# Patient Record
Sex: MALE | Race: BLACK OR AFRICAN AMERICAN | Employment: UNEMPLOYED | URBAN - METROPOLITAN AREA
[De-identification: names, ages, dates, MRNs, and addresses within clinical notes are randomized per-mention and may not be internally consistent; named-entity substitution may affect disease eponyms.]

---

## 2022-05-05 ENCOUNTER — APPOINTMENT (OUTPATIENT)
Dept: GENERAL RADIOLOGY | Age: 22
End: 2022-05-05
Attending: STUDENT IN AN ORGANIZED HEALTH CARE EDUCATION/TRAINING PROGRAM
Payer: MEDICAID

## 2022-05-05 ENCOUNTER — HOSPITAL ENCOUNTER (EMERGENCY)
Age: 22
Discharge: HOME | End: 2022-05-05
Attending: STUDENT IN AN ORGANIZED HEALTH CARE EDUCATION/TRAINING PROGRAM
Payer: MEDICAID

## 2022-05-05 ENCOUNTER — APPOINTMENT (OUTPATIENT)
Dept: CT IMAGING | Age: 22
End: 2022-05-05
Attending: NURSE PRACTITIONER
Payer: MEDICAID

## 2022-05-05 VITALS
RESPIRATION RATE: 16 BRPM | DIASTOLIC BLOOD PRESSURE: 69 MMHG | WEIGHT: 240 LBS | HEIGHT: 74 IN | SYSTOLIC BLOOD PRESSURE: 126 MMHG | BODY MASS INDEX: 30.8 KG/M2 | TEMPERATURE: 99.8 F | HEART RATE: 100 BPM | OXYGEN SATURATION: 99 %

## 2022-05-05 DIAGNOSIS — R10.9 ABDOMINAL PAIN, UNSPECIFIED ABDOMINAL LOCATION: ICD-10-CM

## 2022-05-05 DIAGNOSIS — U07.1 COVID-19 VIRUS INFECTION: Primary | ICD-10-CM

## 2022-05-05 LAB
ALBUMIN SERPL-MCNC: 4.2 G/DL (ref 3.5–5)
ALBUMIN/GLOB SERPL: 1.2 {RATIO} (ref 1.1–2.2)
ALP SERPL-CCNC: 41 U/L (ref 45–117)
ALT SERPL-CCNC: 74 U/L (ref 12–78)
ANION GAP SERPL CALC-SCNC: 7 MMOL/L (ref 5–15)
APPEARANCE UR: CLEAR
AST SERPL W P-5'-P-CCNC: 41 U/L (ref 15–37)
BACTERIA URNS QL MICRO: NEGATIVE /HPF
BASOPHILS # BLD: 0 K/UL (ref 0–0.1)
BASOPHILS NFR BLD: 1 % (ref 0–1)
BILIRUB SERPL-MCNC: 0.8 MG/DL (ref 0.2–1)
BILIRUB UR QL: NEGATIVE
BUN SERPL-MCNC: 8 MG/DL (ref 6–20)
BUN/CREAT SERPL: 7 (ref 12–20)
CA-I BLD-MCNC: 10.1 MG/DL (ref 8.5–10.1)
CHLORIDE SERPL-SCNC: 105 MMOL/L (ref 97–108)
CO2 SERPL-SCNC: 23 MMOL/L (ref 21–32)
COLOR UR: ABNORMAL
COVID-19 RAPID TEST, COVR: DETECTED
CREAT SERPL-MCNC: 1.18 MG/DL (ref 0.7–1.3)
DIFFERENTIAL METHOD BLD: ABNORMAL
EOSINOPHIL # BLD: 0.1 K/UL (ref 0–0.4)
EOSINOPHIL NFR BLD: 1 % (ref 0–7)
ERYTHROCYTE [DISTWIDTH] IN BLOOD BY AUTOMATED COUNT: 14.7 % (ref 11.5–14.5)
FLUAV AG NPH QL IA: NEGATIVE
FLUBV AG NOSE QL IA: NEGATIVE
GLOBULIN SER CALC-MCNC: 3.5 G/DL (ref 2–4)
GLUCOSE SERPL-MCNC: 112 MG/DL (ref 65–100)
GLUCOSE UR STRIP.AUTO-MCNC: NEGATIVE MG/DL
HCT VFR BLD AUTO: 42.3 % (ref 36.6–50.3)
HGB BLD-MCNC: 14 G/DL (ref 12.1–17)
HGB UR QL STRIP: NEGATIVE
IMM GRANULOCYTES # BLD AUTO: 0 K/UL (ref 0–0.04)
IMM GRANULOCYTES NFR BLD AUTO: 0 % (ref 0–0.5)
KETONES UR QL STRIP.AUTO: NEGATIVE MG/DL
LEUKOCYTE ESTERASE UR QL STRIP.AUTO: NEGATIVE
LYMPHOCYTES # BLD: 0.8 K/UL (ref 0.8–3.5)
LYMPHOCYTES NFR BLD: 12 % (ref 12–49)
MCH RBC QN AUTO: 25 PG (ref 26–34)
MCHC RBC AUTO-ENTMCNC: 33.1 G/DL (ref 30–36.5)
MCV RBC AUTO: 75.7 FL (ref 80–99)
MONOCYTES # BLD: 0.7 K/UL (ref 0–1)
MONOCYTES NFR BLD: 10 % (ref 5–13)
MUCOUS THREADS URNS QL MICRO: ABNORMAL /LPF
NEUTS SEG # BLD: 5 K/UL (ref 1.8–8)
NEUTS SEG NFR BLD: 76 % (ref 32–75)
NITRITE UR QL STRIP.AUTO: NEGATIVE
NRBC # BLD: 0 K/UL (ref 0–0.01)
NRBC BLD-RTO: 0 PER 100 WBC
PH UR STRIP: 7 [PH] (ref 5–8)
PLATELET # BLD AUTO: 306 K/UL (ref 150–400)
PMV BLD AUTO: 10.4 FL (ref 8.9–12.9)
POTASSIUM SERPL-SCNC: 4.3 MMOL/L (ref 3.5–5.1)
PROT SERPL-MCNC: 7.7 G/DL (ref 6.4–8.2)
PROT UR STRIP-MCNC: 30 MG/DL
RBC # BLD AUTO: 5.59 M/UL (ref 4.1–5.7)
RBC #/AREA URNS HPF: ABNORMAL /HPF (ref 0–5)
SODIUM SERPL-SCNC: 135 MMOL/L (ref 136–145)
SP GR UR REFRACTOMETRY: 1.03 (ref 1–1.03)
UA: UC IF INDICATED,UAUC: ABNORMAL
UROBILINOGEN UR QL STRIP.AUTO: 2 EU/DL (ref 0.1–1)
WBC # BLD AUTO: 6.6 K/UL (ref 4.1–11.1)
WBC URNS QL MICRO: ABNORMAL /HPF (ref 0–4)

## 2022-05-05 PROCEDURE — 99285 EMERGENCY DEPT VISIT HI MDM: CPT

## 2022-05-05 PROCEDURE — 74011250636 HC RX REV CODE- 250/636: Performed by: NURSE PRACTITIONER

## 2022-05-05 PROCEDURE — 80053 COMPREHEN METABOLIC PANEL: CPT

## 2022-05-05 PROCEDURE — 96375 TX/PRO/DX INJ NEW DRUG ADDON: CPT

## 2022-05-05 PROCEDURE — 96374 THER/PROPH/DIAG INJ IV PUSH: CPT

## 2022-05-05 PROCEDURE — 96361 HYDRATE IV INFUSION ADD-ON: CPT

## 2022-05-05 PROCEDURE — 87804 INFLUENZA ASSAY W/OPTIC: CPT

## 2022-05-05 PROCEDURE — 74177 CT ABD & PELVIS W/CONTRAST: CPT

## 2022-05-05 PROCEDURE — 71045 X-RAY EXAM CHEST 1 VIEW: CPT

## 2022-05-05 PROCEDURE — 74011000636 HC RX REV CODE- 636: Performed by: NURSE PRACTITIONER

## 2022-05-05 PROCEDURE — 85025 COMPLETE CBC W/AUTO DIFF WBC: CPT

## 2022-05-05 PROCEDURE — 87635 SARS-COV-2 COVID-19 AMP PRB: CPT

## 2022-05-05 PROCEDURE — 36415 COLL VENOUS BLD VENIPUNCTURE: CPT

## 2022-05-05 PROCEDURE — 81001 URINALYSIS AUTO W/SCOPE: CPT

## 2022-05-05 RX ORDER — ONDANSETRON 2 MG/ML
4 INJECTION INTRAMUSCULAR; INTRAVENOUS
Status: COMPLETED | OUTPATIENT
Start: 2022-05-05 | End: 2022-05-05

## 2022-05-05 RX ORDER — FAMOTIDINE 20 MG/1
20 TABLET, FILM COATED ORAL 2 TIMES DAILY
Qty: 20 TABLET | Refills: 0 | Status: SHIPPED | OUTPATIENT
Start: 2022-05-05 | End: 2022-05-15

## 2022-05-05 RX ORDER — KETOROLAC TROMETHAMINE 30 MG/ML
30 INJECTION, SOLUTION INTRAMUSCULAR; INTRAVENOUS
Status: COMPLETED | OUTPATIENT
Start: 2022-05-05 | End: 2022-05-05

## 2022-05-05 RX ADMIN — ONDANSETRON 4 MG: 2 INJECTION INTRAMUSCULAR; INTRAVENOUS at 18:35

## 2022-05-05 RX ADMIN — IOPAMIDOL 100 ML: 755 INJECTION, SOLUTION INTRAVENOUS at 20:17

## 2022-05-05 RX ADMIN — KETOROLAC TROMETHAMINE 30 MG: 30 INJECTION, SOLUTION INTRAMUSCULAR; INTRAVENOUS at 18:30

## 2022-05-05 RX ADMIN — SODIUM CHLORIDE 1000 ML: 9 INJECTION, SOLUTION INTRAVENOUS at 18:35

## 2022-05-05 NOTE — Clinical Note
[address] [facility] EMERGENCY DEPT  [address] [facility] [id number] [phone]    Work/School Note    Date: 5/5/2022     To Whom It May concern:    Ayden COSMO Inman DAYLIN was evaluated by the following provider(s):  Nurse Practitioner: Portia Rose NP.   [facility] virus is suspected. Per the CDC guidelines we recommend home isolation until the following conditions are all met:    1. At least five days have passed since symptoms first appeared and/or had a close exposure,   2. After home isolation for five days, wearing a mask around others for the next five days,  3. At least 24 have passed since last fever without the use of fever-reducing medications and  4.  Symptoms (eg cough, shortness of breath) have improved      Sincerely,          Jose Raul Albarran NP

## 2022-05-05 NOTE — ED TRIAGE NOTES
Pt complains of abd pain, nausea, fever and headaches since this morning. States he has taken tylenol with no relief. States he thinks its his appendix.

## 2022-05-05 NOTE — ED PROVIDER NOTES
EMERGENCY DEPARTMENT HISTORY AND PHYSICAL EXAM      Date: 5/5/2022  Patient Name: Teresa Burch III    History of Presenting Illness     Chief Complaint   Patient presents with    Abdominal Pain    Nausea       History Provided By: Patient    HPI: Matthew Greco, 23 y.o. male with a past medical history significant No significant past medical history presents to the ED with cc of abdominal pain. Patient reports worsening right flank pain radiating around to his right lower abdomen since this AM.  He reports associated nausea and chills; febrile upon arrival, unaware. He denies any dysuria, hematuria, penile discharge, diarrhea/constipation or other recent illness. He describes his pain as moderate to severe, sharp in nature, 8/10 presently, no aggravating or alleviating factors. He has not tried taking anything for his symptoms. There are no other complaints, changes, or physical findings at this time. PCP: Shantel George MD    No current facility-administered medications on file prior to encounter. No current outpatient medications on file prior to encounter. Past History     Past Medical History:  No past medical history on file. Past Surgical History:  No past surgical history on file. Family History:  No family history on file. Social History:  Social History     Tobacco Use    Smoking status: Not on file    Smokeless tobacco: Not on file   Substance Use Topics    Alcohol use: Not on file    Drug use: Not on file       Allergies:  No Known Allergies      Review of Systems     Review of Systems   Constitutional: Positive for chills. Negative for fatigue and fever. Respiratory: Negative. Negative for shortness of breath. Cardiovascular: Negative. Negative for chest pain and palpitations. Gastrointestinal: Positive for abdominal pain and nausea. Negative for constipation, diarrhea and vomiting. Genitourinary: Positive for flank pain.  Negative for dysuria, hematuria and penile discharge. Neurological: Negative. All other systems reviewed and are negative. Physical Exam     Physical Exam  Vitals and nursing note reviewed. Constitutional:       General: He is in acute distress. Appearance: He is ill-appearing and diaphoretic. HENT:      Head: Normocephalic and atraumatic. Eyes:      Extraocular Movements: Extraocular movements intact. Conjunctiva/sclera: Conjunctivae normal.   Cardiovascular:      Rate and Rhythm: Regular rhythm. Tachycardia present. Heart sounds: Normal heart sounds. Pulmonary:      Effort: Pulmonary effort is normal.      Breath sounds: Normal breath sounds. No wheezing or rales. Abdominal:      General: Bowel sounds are normal.      Palpations: Abdomen is soft. Tenderness: There is no abdominal tenderness. There is right CVA tenderness. There is no guarding or rebound. Negative signs include McBurney's sign. Musculoskeletal:         General: Normal range of motion. Cervical back: Normal range of motion and neck supple. Skin:     General: Skin is warm. Coloration: Skin is pale. Comments: Diaphoretic   Neurological:      General: No focal deficit present. Mental Status: He is alert. Lab and Diagnostic Study Results     Labs -     Recent Results (from the past 12 hour(s))   METABOLIC PANEL, COMPREHENSIVE    Collection Time: 05/05/22  6:38 PM   Result Value Ref Range    Sodium 135 (L) 136 - 145 mmol/L    Potassium 4.3 3.5 - 5.1 mmol/L    Chloride 105 97 - 108 mmol/L    CO2 23 21 - 32 mmol/L    Anion gap 7 5 - 15 mmol/L    Glucose 112 (H) 65 - 100 mg/dL    BUN 8 6 - 20 mg/dL    Creatinine 1.18 0.70 - 1.30 mg/dL    BUN/Creatinine ratio 7 (L) 12 - 20      GFR est AA >60 >60 ml/min/1.73m2    GFR est non-AA >60 >60 ml/min/1.73m2    Calcium 10.1 8.5 - 10.1 mg/dL    Bilirubin, total 0.8 0.2 - 1.0 mg/dL    AST (SGOT) 41 (H) 15 - 37 U/L    ALT (SGPT) 74 12 - 78 U/L    Alk.  phosphatase 41 (L) 45 - 117 U/L    Protein, total 7.7 6.4 - 8.2 g/dL    Albumin 4.2 3.5 - 5.0 g/dL    Globulin 3.5 2.0 - 4.0 g/dL    A-G Ratio 1.2 1.1 - 2.2     CBC WITH AUTOMATED DIFF    Collection Time: 05/05/22  6:38 PM   Result Value Ref Range    WBC 6.6 4.1 - 11.1 K/uL    RBC 5.59 4. 10 - 5.70 M/uL    HGB 14.0 12.1 - 17.0 g/dL    HCT 42.3 36.6 - 50.3 %    MCV 75.7 (L) 80.0 - 99.0 FL    MCH 25.0 (L) 26.0 - 34.0 PG    MCHC 33.1 30.0 - 36.5 g/dL    RDW 14.7 (H) 11.5 - 14.5 %    PLATELET 575 223 - 003 K/uL    MPV 10.4 8.9 - 12.9 FL    NRBC 0.0 0.0  WBC    ABSOLUTE NRBC 0.00 0.00 - 0.01 K/uL    NEUTROPHILS 76 (H) 32 - 75 %    LYMPHOCYTES 12 12 - 49 %    MONOCYTES 10 5 - 13 %    EOSINOPHILS 1 0 - 7 %    BASOPHILS 1 0 - 1 %    IMMATURE GRANULOCYTES 0 0 - 0.5 %    ABS. NEUTROPHILS 5.0 1.8 - 8.0 K/UL    ABS. LYMPHOCYTES 0.8 0.8 - 3.5 K/UL    ABS. MONOCYTES 0.7 0.0 - 1.0 K/UL    ABS. EOSINOPHILS 0.1 0.0 - 0.4 K/UL    ABS. BASOPHILS 0.0 0.0 - 0.1 K/UL    ABS. IMM.  GRANS. 0.0 0.00 - 0.04 K/UL    DF AUTOMATED     COVID-19 RAPID TEST    Collection Time: 05/05/22  6:38 PM   Result Value Ref Range    COVID-19 rapid test DETECTED (A) Not Detected     INFLUENZA A & B AG (RAPID TEST)    Collection Time: 05/05/22  6:38 PM   Result Value Ref Range    Influenza A Antigen Negative Negative      Influenza B Antigen Negative Negative     URINALYSIS W/ REFLEX CULTURE    Collection Time: 05/05/22  7:38 PM    Specimen: Urine   Result Value Ref Range    Color Yellow/Straw      Appearance Clear Clear      Specific gravity 1.027 1.003 - 1.030      pH (UA) 7.0 5.0 - 8.0      Protein 30 (A) Negative mg/dL    Glucose Negative Negative mg/dL    Ketone Negative Negative mg/dL    Bilirubin Negative Negative      Blood Negative Negative      Urobilinogen 2.0 (H) 0.1 - 1.0 EU/dL    Nitrites Negative Negative      Leukocyte Esterase Negative Negative      UA:UC IF INDICATED Culture not indicated by UA result Culture not indicated by UA result      WBC 0-4 0 - 4 /hpf    RBC 0-5 0 - 5 /hpf    Bacteria Negative Negative /hpf    Mucus 4+ /lpf       Radiologic Studies -   @[email or web address]@  CT Results  (Last 48 hours)               05/05/22 2017  CT ABD PELV W CONT Final result    Impression:  Normal exam. No evidence to explain flank pain or fever       Narrative:  CT dose reduction was achieved through use of a standardized protocol tailored   for this examination and automatic exposure control for dose modulation. Contrast study shows clear lung bases       Liver, spleen, pancreas, gallbladder, adrenals and kidneys are normal.   Specifically, no striated nephrogram       No small bowel vascular abnormality, lymphadenopathy or free fluid       Normal prostate. Bladder is empty. Normal colon and appendix. No mesenteric fat   edema or abdominal hernia       No significant bone finding               CXR Results  (Last 48 hours)               05/05/22 1855  XR CHEST PORT Final result    Narrative:  Chest single view. Lungs clear; no interstitial or alveolar pulmonary edema. Cardiac and   mediastinal structures within normal limits. No pneumothorax or sizable pleural   effusion. Medical Decision Making   - I am the first provider for this patient. - I reviewed the vital signs, available nursing notes, past medical history, past surgical history, family history and social history. - Initial assessment performed. The patients presenting problems have been discussed, and they are in agreement with the care plan formulated and outlined with them. I have encouraged them to ask questions as they arise throughout their visit. Vital Signs-Reviewed the patient's vital signs.   Patient Vitals for the past 12 hrs:   Temp Pulse Resp BP SpO2   05/05/22 2207 99.8 °F (37.7 °C) 100 16 126/69 99 %   05/05/22 2119 99.9 °F (37.7 °C)       05/05/22 1813 (!) 101.9 °F (38.8 °C) (!) 115 18 117/73 98 %       Records Reviewed: Nursing Notes    The patient presents with abdominal pain with a differential diagnosis of abdominal pain, appendicitis, cholecystitis, gastritis, pancreatitis, PUD and renal Colic      ED Course:   Patient presents with 1 day history of right abdominal/flank pain; denies any other symptoms. He is in moderate distress upon arrival, diaphoretic and uncomfortable appearing. Patient is febrile at 101.9, unaware, no tachycardia or hypoxia. Labs: no leukocytosis, influenza negative, UA normal, COVID positive. CT abdomen without acute findings. Patient was treated with IVF and antipyretics with improvement of symptoms. Tolerating p.o. intake. Reinforced importance of self-isolation. Discussed symptomatic treatment along with red flags and return precautions. pcp follow up  ED Course as of 05/06/22 0449   Thu May 05, 2022   1930 Patient states pain has improved some, now \"tolerable\" [LP]   2040 Updated on results, awaiting CT results still [LP]      ED Course User Index  [LP] Harriet Sprague NP       Provider Notes (Medical Decision Making):     MDM  Number of Diagnoses or Management Options  Abdominal pain, unspecified abdominal location: new, needed workup  COVID-19 virus infection: new, no workup     Amount and/or Complexity of Data Reviewed  Clinical lab tests: ordered and reviewed  Tests in the radiology section of CPT®: ordered and reviewed  Review and summarize past medical records: yes    Risk of Complications, Morbidity, and/or Mortality  Presenting problems: moderate  Diagnostic procedures: moderate  Management options: moderate    Patient Progress  Patient progress: stable         Disposition   Disposition: Condition stable  DC- Adult Discharges: All of the diagnostic tests were reviewed and questions answered. Diagnosis, care plan and treatment options were discussed. The patient understands the instructions and will follow up as directed. The patients results have been reviewed with them. They have been counseled regarding their diagnosis.   The patient verbally convey understanding and agreement of the signs, symptoms, diagnosis, treatment and prognosis and additionally agrees to follow up as recommended with their PCP in 24 - 48 hours. They also agree with the care-plan and convey that all of their questions have been answered. I have also put together some discharge instructions for them that include: 1) educational information regarding their diagnosis, 2) how to care for their diagnosis at home, as well a 3) list of reasons why they would want to return to the ED prior to their follow-up appointment, should their condition change. DC-The patient was given verbal abdominal pain warning signs and, fever treatment  and follow-up instructions  DC- Pain Control DC Home plan: Nonsteroidals, Tylenol, Referral Family Medicine/PCP and Advised to return for worsening or additional problems such as abdominal or chest pain        DISCHARGE PLAN:  1. There are no discharge medications for this patient. 2.   Follow-up Information     Follow up With Specialties Details Why Contact Info    Dean Dixon MD Internal Medicine Physician Schedule an appointment as soon as possible for a visit in 2 days for ER follow up [place] [id number]  [phone]          3. Return to ED if worse   4. Discharge Medication List as of 5/5/2022 10:02 PM            Diagnosis     Clinical Impression:   1. COVID-19 virus infection    2. Abdominal pain, unspecified abdominal location        Attestations:    Ashli Kinney NP    Please note that this dictation was completed with Kickboard, the computer voice recognition software. Quite often unanticipated grammatical, syntax, homophones, and other interpretive errors are inadvertently transcribed by the computer software. Please disregard these errors. Please excuse any errors that have escaped final proofreading. Thank you.

## 2022-05-06 ENCOUNTER — PATIENT OUTREACH (OUTPATIENT)
Dept: CASE MANAGEMENT | Age: 22
End: 2022-05-06

## 2022-05-06 NOTE — DISCHARGE INSTRUCTIONS
Thank you! Thank you for allowing me to care for you in the emergency department. I sincerely hope that you are satisfied with your visit today. It is my goal to provide you with excellent care. Below you will find a list of your labs and imaging from your visit today. Should you have any questions regarding these results please do not hesitate to call the emergency department. Labs -     Recent Results (from the past 12 hour(s))   METABOLIC PANEL, COMPREHENSIVE    Collection Time: 05/05/22  6:38 PM   Result Value Ref Range    Sodium 135 (L) 136 - 145 mmol/L    Potassium 4.3 3.5 - 5.1 mmol/L    Chloride 105 97 - 108 mmol/L    CO2 23 21 - 32 mmol/L    Anion gap 7 5 - 15 mmol/L    Glucose 112 (H) 65 - 100 mg/dL    BUN 8 6 - 20 mg/dL    Creatinine 1.18 0.70 - 1.30 mg/dL    BUN/Creatinine ratio 7 (L) 12 - 20      GFR est AA >60 >60 ml/min/1.73m2    GFR est non-AA >60 >60 ml/min/1.73m2    Calcium 10.1 8.5 - 10.1 mg/dL    Bilirubin, total 0.8 0.2 - 1.0 mg/dL    AST (SGOT) 41 (H) 15 - 37 U/L    ALT (SGPT) 74 12 - 78 U/L    Alk. phosphatase 41 (L) 45 - 117 U/L    Protein, total 7.7 6.4 - 8.2 g/dL    Albumin 4.2 3.5 - 5.0 g/dL    Globulin 3.5 2.0 - 4.0 g/dL    A-G Ratio 1.2 1.1 - 2.2     CBC WITH AUTOMATED DIFF    Collection Time: 05/05/22  6:38 PM   Result Value Ref Range    WBC 6.6 4.1 - 11.1 K/uL    RBC 5.59 4. 10 - 5.70 M/uL    HGB 14.0 12.1 - 17.0 g/dL    HCT 42.3 36.6 - 50.3 %    MCV 75.7 (L) 80.0 - 99.0 FL    MCH 25.0 (L) 26.0 - 34.0 PG    MCHC 33.1 30.0 - 36.5 g/dL    RDW 14.7 (H) 11.5 - 14.5 %    PLATELET 318 592 - 902 K/uL    MPV 10.4 8.9 - 12.9 FL    NRBC 0.0 0.0  WBC    ABSOLUTE NRBC 0.00 0.00 - 0.01 K/uL    NEUTROPHILS 76 (H) 32 - 75 %    LYMPHOCYTES 12 12 - 49 %    MONOCYTES 10 5 - 13 %    EOSINOPHILS 1 0 - 7 %    BASOPHILS 1 0 - 1 %    IMMATURE GRANULOCYTES 0 0 - 0.5 %    ABS. NEUTROPHILS 5.0 1.8 - 8.0 K/UL    ABS. LYMPHOCYTES 0.8 0.8 - 3.5 K/UL    ABS. MONOCYTES 0.7 0.0 - 1.0 K/UL    ABS. EOSINOPHILS 0.1 0.0 - 0.4 K/UL    ABS. BASOPHILS 0.0 0.0 - 0.1 K/UL    ABS. IMM. GRANS. 0.0 0.00 - 0.04 K/UL    DF AUTOMATED     COVID-19 RAPID TEST    Collection Time: 05/05/22  6:38 PM   Result Value Ref Range    COVID-19 rapid test DETECTED (A) Not Detected     INFLUENZA A & B AG (RAPID TEST)    Collection Time: 05/05/22  6:38 PM   Result Value Ref Range    Influenza A Antigen Negative Negative      Influenza B Antigen Negative Negative     URINALYSIS W/ REFLEX CULTURE    Collection Time: 05/05/22  7:38 PM    Specimen: Urine   Result Value Ref Range    Color Yellow/Straw      Appearance Clear Clear      Specific gravity 1.027 1.003 - 1.030      pH (UA) 7.0 5.0 - 8.0      Protein 30 (A) Negative mg/dL    Glucose Negative Negative mg/dL    Ketone Negative Negative mg/dL    Bilirubin Negative Negative      Blood Negative Negative      Urobilinogen 2.0 (H) 0.1 - 1.0 EU/dL    Nitrites Negative Negative      Leukocyte Esterase Negative Negative      UA:UC IF INDICATED Culture not indicated by UA result Culture not indicated by UA result      WBC 0-4 0 - 4 /hpf    RBC 0-5 0 - 5 /hpf    Bacteria Negative Negative /hpf    Mucus 4+ /lpf       Radiologic Studies -   CT ABD PELV W CONT   Final Result   Normal exam. No evidence to explain flank pain or fever      XR CHEST PORT   Final Result        CT Results  (Last 48 hours)                 05/05/22 2017  CT ABD PELV W CONT Final result    Impression:  Normal exam. No evidence to explain flank pain or fever       Narrative:  CT dose reduction was achieved through use of a standardized protocol tailored   for this examination and automatic exposure control for dose modulation. Contrast study shows clear lung bases       Liver, spleen, pancreas, gallbladder, adrenals and kidneys are normal.   Specifically, no striated nephrogram       No small bowel vascular abnormality, lymphadenopathy or free fluid       Normal prostate. Bladder is empty. Normal colon and appendix.  No mesenteric fat   edema or abdominal hernia       No significant bone finding                 CXR Results  (Last 48 hours)                 05/05/22 9658  XR CHEST PORT Final result    Narrative:  Chest single view. Lungs clear; no interstitial or alveolar pulmonary edema. Cardiac and   mediastinal structures within normal limits. No pneumothorax or sizable pleural   effusion. If you feel that you have not received excellent quality care or timely care, please ask to speak to the nurse manager. Please choose us in the future for your continued health care needs. ------------------------------------------------------------------------------------------------------------  The exam and treatment you received in the Emergency Department were for an urgent problem and are not intended as complete care. It is important that you follow-up with a doctor, nurse practitioner, or physician assistant to:  (1) confirm your diagnosis,  (2) re-evaluation of changes in your illness and treatment, and  (3) for ongoing care. If your symptoms become worse or you do not improve as expected and you are unable to reach your usual health care provider, you should return to the Emergency Department. We are available 24 hours a day. Please take your discharge instructions with you when you go to your follow-up appointment. If you have any problem arranging a follow-up appointment, contact the Emergency Department immediately. If a prescription has been provided, please have it filled as soon as possible to prevent a delay in treatment. Read the entire medication instruction sheet provided to you by the pharmacy. If you have any questions or reservations about taking the medication due to side effects or interactions with other medications, please call your primary care physician or contact the ER to speak with the charge nurse.      Make an appointment with your family doctor or the physician you were referred to for follow-up of this visit as instructed on your discharge paperwork, as this is a mandatory follow-up. Return to the ER if you are unable to be seen or if you are unable to be seen in a timely manner. If you have any problem arranging the follow-up visit, contact the Emergency Department immediately.

## 2024-07-18 ENCOUNTER — HOSPITAL ENCOUNTER (EMERGENCY)
Facility: HOSPITAL | Age: 24
Discharge: HOME | End: 2024-07-18
Attending: EMERGENCY MEDICINE
Payer: MEDICAID

## 2024-07-18 VITALS
HEIGHT: 74 IN | TEMPERATURE: 97.8 F | HEART RATE: 59 BPM | DIASTOLIC BLOOD PRESSURE: 91 MMHG | RESPIRATION RATE: 16 BRPM | OXYGEN SATURATION: 99 % | SYSTOLIC BLOOD PRESSURE: 136 MMHG | BODY MASS INDEX: 30.16 KG/M2 | WEIGHT: 235 LBS

## 2024-07-18 DIAGNOSIS — R10.13 EPIGASTRIC PAIN: ICD-10-CM

## 2024-07-18 DIAGNOSIS — R11.2 NAUSEA AND VOMITING, UNSPECIFIED VOMITING TYPE: Primary | ICD-10-CM

## 2024-07-18 LAB
ALBUMIN SERPL-MCNC: 4.1 G/DL (ref 3.5–5)
ALBUMIN/GLOB SERPL: 1.1 (ref 1.1–2.2)
ALP SERPL-CCNC: 42 U/L (ref 45–117)
ALT SERPL-CCNC: 20 U/L (ref 12–78)
AMPHET UR QL SCN: NEGATIVE
ANION GAP SERPL CALC-SCNC: 6 MMOL/L (ref 5–15)
APPEARANCE UR: CLEAR
AST SERPL W P-5'-P-CCNC: ABNORMAL U/L (ref 15–37)
BACTERIA URNS QL MICRO: NEGATIVE /HPF
BARBITURATES UR QL SCN: NEGATIVE
BASOPHILS # BLD: 0.1 K/UL (ref 0–0.1)
BASOPHILS NFR BLD: 0 % (ref 0–1)
BENZODIAZ UR QL: NEGATIVE
BILIRUB SERPL-MCNC: 0.9 MG/DL (ref 0.2–1)
BILIRUB UR QL: NEGATIVE
BUN SERPL-MCNC: 10 MG/DL (ref 6–20)
BUN/CREAT SERPL: 9 (ref 12–20)
CA-I BLD-MCNC: 9.4 MG/DL (ref 8.5–10.1)
CANNABINOIDS UR QL SCN: NEGATIVE
CHLORIDE SERPL-SCNC: 105 MMOL/L (ref 97–108)
CO2 SERPL-SCNC: 28 MMOL/L (ref 21–32)
COCAINE UR QL SCN: NEGATIVE
COLOR UR: ABNORMAL
CREAT SERPL-MCNC: 1.1 MG/DL (ref 0.7–1.3)
DIFFERENTIAL METHOD BLD: ABNORMAL
EOSINOPHIL # BLD: 0.1 K/UL (ref 0–0.4)
EOSINOPHIL NFR BLD: 1 % (ref 0–7)
ERYTHROCYTE [DISTWIDTH] IN BLOOD BY AUTOMATED COUNT: 14.2 % (ref 11.5–14.5)
GLOBULIN SER CALC-MCNC: 3.9 G/DL (ref 2–4)
GLUCOSE SERPL-MCNC: 102 MG/DL (ref 65–100)
GLUCOSE UR STRIP.AUTO-MCNC: NEGATIVE MG/DL
HCT VFR BLD AUTO: 43.6 % (ref 36.6–50.3)
HGB BLD-MCNC: 14 G/DL (ref 12.1–17)
HGB UR QL STRIP: NEGATIVE
IMM GRANULOCYTES # BLD AUTO: 0.1 K/UL (ref 0–0.04)
IMM GRANULOCYTES NFR BLD AUTO: 0 % (ref 0–0.5)
KETONES UR QL STRIP.AUTO: 5 MG/DL
LEUKOCYTE ESTERASE UR QL STRIP.AUTO: NEGATIVE
LIPASE SERPL-CCNC: 17 U/L (ref 13–75)
LYMPHOCYTES # BLD: 1.8 K/UL (ref 0.8–3.5)
LYMPHOCYTES NFR BLD: 14 % (ref 12–49)
Lab: NORMAL
MCH RBC QN AUTO: 25.5 PG (ref 26–34)
MCHC RBC AUTO-ENTMCNC: 32.1 G/DL (ref 30–36.5)
MCV RBC AUTO: 79.6 FL (ref 80–99)
METHADONE UR QL: NEGATIVE
MONOCYTES # BLD: 0.6 K/UL (ref 0–1)
MONOCYTES NFR BLD: 4 % (ref 5–13)
MUCOUS THREADS URNS QL MICRO: ABNORMAL /LPF
NEUTS SEG # BLD: 10.1 K/UL (ref 1.8–8)
NEUTS SEG NFR BLD: 81 % (ref 32–75)
NITRITE UR QL STRIP.AUTO: NEGATIVE
NRBC # BLD: 0 K/UL (ref 0–0.01)
NRBC BLD-RTO: 0 PER 100 WBC
OPIATES UR QL: NEGATIVE
PCP UR QL: NEGATIVE
PH UR STRIP: 5 (ref 5–8)
PLATELET # BLD AUTO: 368 K/UL (ref 150–400)
PMV BLD AUTO: 10.6 FL (ref 8.9–12.9)
POTASSIUM SERPL-SCNC: ABNORMAL MMOL/L (ref 3.5–5.1)
PROT SERPL-MCNC: 8 G/DL (ref 6.4–8.2)
PROT UR STRIP-MCNC: NEGATIVE MG/DL
RBC # BLD AUTO: 5.48 M/UL (ref 4.1–5.7)
RBC #/AREA URNS HPF: ABNORMAL /HPF (ref 0–5)
SODIUM SERPL-SCNC: 139 MMOL/L (ref 136–145)
SP GR UR REFRACTOMETRY: 1.02 (ref 1–1.03)
UROBILINOGEN UR QL STRIP.AUTO: 0.1 EU/DL (ref 0.1–1)
WBC # BLD AUTO: 12.6 K/UL (ref 4.1–11.1)
WBC URNS QL MICRO: ABNORMAL /HPF (ref 0–4)

## 2024-07-18 PROCEDURE — 6360000002 HC RX W HCPCS: Performed by: EMERGENCY MEDICINE

## 2024-07-18 PROCEDURE — 96374 THER/PROPH/DIAG INJ IV PUSH: CPT

## 2024-07-18 PROCEDURE — 83690 ASSAY OF LIPASE: CPT

## 2024-07-18 PROCEDURE — 36415 COLL VENOUS BLD VENIPUNCTURE: CPT

## 2024-07-18 PROCEDURE — 80053 COMPREHEN METABOLIC PANEL: CPT

## 2024-07-18 PROCEDURE — 99284 EMERGENCY DEPT VISIT MOD MDM: CPT

## 2024-07-18 PROCEDURE — 81001 URINALYSIS AUTO W/SCOPE: CPT

## 2024-07-18 PROCEDURE — 85025 COMPLETE CBC W/AUTO DIFF WBC: CPT

## 2024-07-18 PROCEDURE — 80307 DRUG TEST PRSMV CHEM ANLYZR: CPT

## 2024-07-18 PROCEDURE — 6370000000 HC RX 637 (ALT 250 FOR IP): Performed by: EMERGENCY MEDICINE

## 2024-07-18 PROCEDURE — 99285 EMERGENCY DEPT VISIT HI MDM: CPT

## 2024-07-18 RX ORDER — LIDOCAINE HYDROCHLORIDE 20 MG/ML
15 SOLUTION OROPHARYNGEAL EVERY 4 HOURS PRN
Qty: 250 ML | Refills: 0 | Status: SHIPPED | OUTPATIENT
Start: 2024-07-18

## 2024-07-18 RX ORDER — MAGNESIUM HYDROXIDE/ALUMINUM HYDROXICE/SIMETHICONE 120; 1200; 1200 MG/30ML; MG/30ML; MG/30ML
30 SUSPENSION ORAL
Status: COMPLETED | OUTPATIENT
Start: 2024-07-18 | End: 2024-07-18

## 2024-07-18 RX ORDER — ONDANSETRON 2 MG/ML
4 INJECTION INTRAMUSCULAR; INTRAVENOUS ONCE
Status: COMPLETED | OUTPATIENT
Start: 2024-07-18 | End: 2024-07-18

## 2024-07-18 RX ORDER — LIDOCAINE HYDROCHLORIDE 20 MG/ML
15 SOLUTION OROPHARYNGEAL
Status: COMPLETED | OUTPATIENT
Start: 2024-07-18 | End: 2024-07-18

## 2024-07-18 RX ORDER — ONDANSETRON 4 MG/1
4 TABLET, ORALLY DISINTEGRATING ORAL EVERY 8 HOURS PRN
Qty: 20 TABLET | Refills: 0 | Status: SHIPPED | OUTPATIENT
Start: 2024-07-18

## 2024-07-18 RX ADMIN — ONDANSETRON 4 MG: 2 INJECTION INTRAMUSCULAR; INTRAVENOUS at 05:00

## 2024-07-18 RX ADMIN — ALUMINUM HYDROXIDE, MAGNESIUM HYDROXIDE, AND SIMETHICONE 30 ML: 200; 200; 20 SUSPENSION ORAL at 06:27

## 2024-07-18 RX ADMIN — LIDOCAINE HYDROCHLORIDE 15 ML: 20 SOLUTION ORAL at 06:27

## 2024-07-18 ASSESSMENT — LIFESTYLE VARIABLES
HOW OFTEN DO YOU HAVE A DRINK CONTAINING ALCOHOL: NEVER
HOW MANY STANDARD DRINKS CONTAINING ALCOHOL DO YOU HAVE ON A TYPICAL DAY: PATIENT DOES NOT DRINK

## 2024-07-18 ASSESSMENT — PAIN SCALES - GENERAL
PAINLEVEL_OUTOF10: 4
PAINLEVEL_OUTOF10: 4

## 2024-07-18 ASSESSMENT — PAIN DESCRIPTION - DESCRIPTORS: DESCRIPTORS: SHARP

## 2024-07-18 ASSESSMENT — PAIN - FUNCTIONAL ASSESSMENT: PAIN_FUNCTIONAL_ASSESSMENT: 0-10

## 2024-07-18 ASSESSMENT — PAIN DESCRIPTION - LOCATION: LOCATION: ABDOMEN

## 2024-07-18 NOTE — ED PROVIDER NOTES
[facility] EMERGENCY DEPT  EMERGENCY DEPARTMENT HISTORY AND PHYSICAL EXAM      Date: 7/18/2024  Patient Name: Vinay Clay III  MRN: [id number]  Birthdate 2000  Date of evaluation: 7/18/2024  Provider: Dominguez Spencer MD   Note Started: 4:51 AM EDT 7/18/24    HISTORY OF PRESENT ILLNESS     Chief Complaint   Patient presents with    Abdominal Pain    Emesis       History Provided By: Patient and EMS    HPI: Vinay Clay III is a 24 y.o. male presents for evaluation of epigastric abdominal pain with nausea and vomiting.  Patient reports that he woke up around 2:00 this morning with the symptoms.  Denies diarrhea, denies fevers or chills.  EMS states the blood sugar was 141 on their evaluation, and they started a liter of fluids.    PAST MEDICAL HISTORY   Past Medical History:  History reviewed. No pertinent past medical history.    Past Surgical History:  History reviewed. No pertinent surgical history.    Family History:  History reviewed. No pertinent family history.    Social History:  Social History     Tobacco Use    Smoking status: Never    Smokeless tobacco: Never   Substance Use Topics    Alcohol use: Not Currently    Drug use: Never       Allergies:  No Known Allergies    PCP: Velia Pop MD    Current Meds:   No current facility-administered medications for this encounter.     Current Outpatient Medications   Medication Sig Dispense Refill    ondansetron (ZOFRAN-ODT) 4 MG disintegrating tablet Place 1 tablet under the tongue every 8 hours as needed for Nausea or Vomiting 20 tablet 0    lidocaine viscous hcl (XYLOCAINE) 2 % SOLN solution Take 15 mLs by mouth every 4 hours as needed (Reflux/abdominal pain) Mix each dose at home with 30 mL over-the-counter Maalox or Mylanta 250 mL 0       Social Determinants of Health:   Social Determinants of Health     Tobacco Use: Low Risk  (7/18/2024)    Patient History     Smoking Tobacco Use: Never     Smokeless Tobacco Use: Never     Passive Exposure:

## 2024-07-18 NOTE — DISCHARGE INSTRUCTIONS
Thank you for choosing our Emergency Department for your care.  It is our privilege to care for you in your time of need.  In the next several days, you may receive a survey via email or mailed to your home about your experience with our team.  We would greatly appreciate you taking a few minutes to complete the survey, as we use this information to learn what we have done well and what we could be doing better. Thank you for trusting us with your care!    Below you will find a list of your tests from today's visit.   Labs  Recent Results (from the past 12 hour(s))   CBC with Auto Differential    Collection Time: 07/18/24  4:57 AM   Result Value Ref Range    WBC 12.6 (H) 4.1 - 11.1 K/uL    RBC 5.48 4.10 - 5.70 M/uL    Hemoglobin 14.0 12.1 - 17.0 g/dL    Hematocrit 43.6 36.6 - 50.3 %    MCV 79.6 (L) 80.0 - 99.0 FL    MCH 25.5 (L) 26.0 - 34.0 PG    MCHC 32.1 30.0 - 36.5 g/dL    RDW 14.2 11.5 - 14.5 %    Platelets 368 150 - 400 K/uL    MPV 10.6 8.9 - 12.9 FL    Nucleated RBCs 0.0 0.0  WBC    nRBC 0.00 0.00 - 0.01 K/uL    Neutrophils % 81 (H) 32 - 75 %    Lymphocytes % 14 12 - 49 %    Monocytes % 4 (L) 5 - 13 %    Eosinophils % 1 0 - 7 %    Basophils % 0 0 - 1 %    Immature Granulocytes % 0 0 - 0.5 %    Neutrophils Absolute 10.1 (H) 1.8 - 8.0 K/UL    Lymphocytes Absolute 1.8 0.8 - 3.5 K/UL    Monocytes Absolute 0.6 0.0 - 1.0 K/UL    Eosinophils Absolute 0.1 0.0 - 0.4 K/UL    Basophils Absolute 0.1 0.0 - 0.1 K/UL    Immature Granulocytes Absolute 0.1 (H) 0.00 - 0.04 K/UL    Differential Type AUTOMATED     Comprehensive Metabolic Panel    Collection Time: 07/18/24  4:57 AM   Result Value Ref Range    Sodium 139 136 - 145 mmol/L    Potassium Hemolyzed, Recollection Recommended 3.5 - 5.1 mmol/L    Chloride 105 97 - 108 mmol/L    CO2 28 21 - 32 mmol/L    Anion Gap 6 5 - 15 mmol/L    Glucose 102 (H) 65 - 100 mg/dL    BUN 10 6 - 20 mg/dL    Creatinine 1.10 0.70 - 1.30 mg/dL    BUN/Creatinine Ratio 9 (L) 12 - 20       findings by GC/MS.         Radiologic Studies  No orders to display     ------------------------------------------------------------------------------------------------------------  The evaluation and treatment you received in the Emergency Department were for an urgent problem. It is important that you follow-up with a doctor, nurse practitioner, or physician assistant to:  (1) confirm your diagnosis,  (2) re-evaluation of changes in your illness and treatment, and (3) for ongoing care. Please take your discharge instructions with you when you go to your follow-up appointment.     If you have any problem arranging a follow-up appointment, contact us!  If your symptoms become worse or you do not improve as expected, please return to us. We are available 24 hours a day.     If a prescription has been provided, please fill it as soon as possible to prevent a delay in treatment. If you have any questions or reservations about taking the medication due to side effects or interactions with other medications, please call your primary care provider or contact us directly.  Again, THANK YOU for choosing us to care for YOU!

## 2024-07-18 NOTE — ED NOTES
Verbal report to Page RN. Patient connected to continuous cardiac, bp, sp02 monitoring. No signs of distress noted. Call bell within reach, bed locked and in lowest position. All questions answered accordingly.

## 2024-07-18 NOTE — ED TRIAGE NOTES
Per EMS, patient woke up around 0200 this AM with epigastric pain and vomiting.. Recent diagnosis of food poisoning per patient. Bradycardic in 50's for EMS. Alert and oriented on arrival, room air.      on EMS truck.

## 2024-07-19 ENCOUNTER — APPOINTMENT (OUTPATIENT)
Facility: HOSPITAL | Age: 24
End: 2024-07-19
Payer: MEDICAID

## 2024-07-19 ENCOUNTER — HOSPITAL ENCOUNTER (EMERGENCY)
Facility: HOSPITAL | Age: 24
Discharge: HOME | End: 2024-07-19
Attending: EMERGENCY MEDICINE
Payer: MEDICAID

## 2024-07-19 VITALS
RESPIRATION RATE: 16 BRPM | WEIGHT: 235 LBS | SYSTOLIC BLOOD PRESSURE: 128 MMHG | TEMPERATURE: 98.1 F | HEIGHT: 74 IN | DIASTOLIC BLOOD PRESSURE: 72 MMHG | HEART RATE: 68 BPM | OXYGEN SATURATION: 99 % | BODY MASS INDEX: 30.16 KG/M2

## 2024-07-19 DIAGNOSIS — R11.2 NAUSEA AND VOMITING, UNSPECIFIED VOMITING TYPE: ICD-10-CM

## 2024-07-19 DIAGNOSIS — R10.13 ABDOMINAL PAIN, EPIGASTRIC: Primary | ICD-10-CM

## 2024-07-19 LAB
ALBUMIN SERPL-MCNC: 4.3 G/DL (ref 3.5–5)
ALBUMIN/GLOB SERPL: 1.3 (ref 1.1–2.2)
ALP SERPL-CCNC: 60 U/L (ref 45–117)
ALT SERPL-CCNC: 109 U/L (ref 12–78)
ANION GAP BLD CALC-SCNC: 10
AST SERPL W P-5'-P-CCNC: 227 U/L (ref 15–37)
BASOPHILS # BLD: 0 K/UL (ref 0–0.1)
BASOPHILS NFR BLD: 0 % (ref 0–1)
BILIRUB DIRECT SERPL-MCNC: 0.7 MG/DL (ref 0–0.2)
BILIRUB SERPL-MCNC: 1.5 MG/DL (ref 0.2–1)
CA-I BLD-MCNC: 1.18 MMOL/L (ref 1.12–1.32)
CHLORIDE BLD-SCNC: 104 MMOL/L (ref 98–107)
CO2 BLD-SCNC: 32 MMOL/L
CREAT UR-MCNC: 0.96 MG/DL (ref 0.6–1.3)
DIFFERENTIAL METHOD BLD: ABNORMAL
EOSINOPHIL # BLD: 0.1 K/UL (ref 0–0.4)
EOSINOPHIL NFR BLD: 1 % (ref 0–7)
ERYTHROCYTE [DISTWIDTH] IN BLOOD BY AUTOMATED COUNT: 14.4 % (ref 11.5–14.5)
GLOBULIN SER CALC-MCNC: 3.4 G/DL (ref 2–4)
GLUCOSE BLD STRIP.AUTO-MCNC: 99 MG/DL (ref 65–100)
HCT VFR BLD AUTO: 45.3 % (ref 36.6–50.3)
HGB BLD-MCNC: 14.6 G/DL (ref 12.1–17)
IMM GRANULOCYTES # BLD AUTO: 0 K/UL (ref 0–0.04)
IMM GRANULOCYTES NFR BLD AUTO: 0 % (ref 0–0.5)
LACTATE BLD-SCNC: 1.3 MMOL/L (ref 0.4–2)
LIPASE SERPL-CCNC: 120 U/L (ref 13–75)
LYMPHOCYTES # BLD: 2.2 K/UL (ref 0.8–3.5)
LYMPHOCYTES NFR BLD: 25 % (ref 12–49)
MCH RBC QN AUTO: 25.4 PG (ref 26–34)
MCHC RBC AUTO-ENTMCNC: 32.2 G/DL (ref 30–36.5)
MCV RBC AUTO: 78.9 FL (ref 80–99)
MONOCYTES # BLD: 0.8 K/UL (ref 0–1)
MONOCYTES NFR BLD: 9 % (ref 5–13)
NEUTS SEG # BLD: 5.8 K/UL (ref 1.8–8)
NEUTS SEG NFR BLD: 65 % (ref 32–75)
NRBC # BLD: 0 K/UL (ref 0–0.01)
NRBC BLD-RTO: 0 PER 100 WBC
PERFORMED BY:: NORMAL
PLATELET # BLD AUTO: 365 K/UL (ref 150–400)
PMV BLD AUTO: 10.1 FL (ref 8.9–12.9)
POTASSIUM BLD-SCNC: 3.6 MMOL/L (ref 3.5–5.5)
PROT SERPL-MCNC: 7.7 G/DL (ref 6.4–8.2)
RBC # BLD AUTO: 5.74 M/UL (ref 4.1–5.7)
SODIUM BLD-SCNC: 145 MMOL/L (ref 136–145)
SPECIMEN SITE: NORMAL
WBC # BLD AUTO: 8.9 K/UL (ref 4.1–11.1)

## 2024-07-19 PROCEDURE — 94761 N-INVAS EAR/PLS OXIMETRY MLT: CPT

## 2024-07-19 PROCEDURE — 6370000000 HC RX 637 (ALT 250 FOR IP): Performed by: EMERGENCY MEDICINE

## 2024-07-19 PROCEDURE — 83690 ASSAY OF LIPASE: CPT

## 2024-07-19 PROCEDURE — 80076 HEPATIC FUNCTION PANEL: CPT

## 2024-07-19 PROCEDURE — 85025 COMPLETE CBC W/AUTO DIFF WBC: CPT

## 2024-07-19 PROCEDURE — 74177 CT ABD & PELVIS W/CONTRAST: CPT

## 2024-07-19 PROCEDURE — 36415 COLL VENOUS BLD VENIPUNCTURE: CPT

## 2024-07-19 PROCEDURE — 83605 ASSAY OF LACTIC ACID: CPT

## 2024-07-19 PROCEDURE — 6360000002 HC RX W HCPCS: Performed by: EMERGENCY MEDICINE

## 2024-07-19 PROCEDURE — 96374 THER/PROPH/DIAG INJ IV PUSH: CPT

## 2024-07-19 PROCEDURE — 80047 BASIC METABLC PNL IONIZED CA: CPT

## 2024-07-19 PROCEDURE — 6360000004 HC RX CONTRAST MEDICATION: Performed by: EMERGENCY MEDICINE

## 2024-07-19 RX ORDER — LIDOCAINE HYDROCHLORIDE 20 MG/ML
15 SOLUTION OROPHARYNGEAL
Status: COMPLETED | OUTPATIENT
Start: 2024-07-19 | End: 2024-07-19

## 2024-07-19 RX ORDER — MAGNESIUM HYDROXIDE/ALUMINUM HYDROXICE/SIMETHICONE 120; 1200; 1200 MG/30ML; MG/30ML; MG/30ML
30 SUSPENSION ORAL
Status: COMPLETED | OUTPATIENT
Start: 2024-07-19 | End: 2024-07-19

## 2024-07-19 RX ORDER — ONDANSETRON 2 MG/ML
4 INJECTION INTRAMUSCULAR; INTRAVENOUS ONCE
Status: COMPLETED | OUTPATIENT
Start: 2024-07-19 | End: 2024-07-19

## 2024-07-19 RX ADMIN — LIDOCAINE HYDROCHLORIDE 15 ML: 20 SOLUTION ORAL at 01:51

## 2024-07-19 RX ADMIN — ALUMINUM HYDROXIDE, MAGNESIUM HYDROXIDE, AND SIMETHICONE 30 ML: 1200; 120; 1200 SUSPENSION ORAL at 01:51

## 2024-07-19 RX ADMIN — ONDANSETRON 4 MG: 2 INJECTION INTRAMUSCULAR; INTRAVENOUS at 00:33

## 2024-07-19 RX ADMIN — IOPAMIDOL 100 ML: 755 INJECTION, SOLUTION INTRAVENOUS at 00:54

## 2024-07-19 ASSESSMENT — PAIN - FUNCTIONAL ASSESSMENT: PAIN_FUNCTIONAL_ASSESSMENT: 0-10

## 2024-07-19 ASSESSMENT — LIFESTYLE VARIABLES
HOW MANY STANDARD DRINKS CONTAINING ALCOHOL DO YOU HAVE ON A TYPICAL DAY: PATIENT DOES NOT DRINK
HOW OFTEN DO YOU HAVE A DRINK CONTAINING ALCOHOL: NEVER

## 2024-07-19 ASSESSMENT — PAIN SCALES - GENERAL
PAINLEVEL_OUTOF10: 8
PAINLEVEL_OUTOF10: 2

## 2024-07-19 ASSESSMENT — PAIN DESCRIPTION - LOCATION: LOCATION: ABDOMEN

## 2024-07-19 NOTE — ED PROVIDER NOTES
Not At Risk (7/19/2024)    AUDIT-C     Frequency of Alcohol Consumption: Never     Average Number of Drinks: Patient does not drink     Frequency of Binge Drinking: Never   Financial Resource Strain: Not on file   Food Insecurity: Not on file   Transportation Needs: Not on file   Physical Activity: Not on file   Stress: Not on file   Social Connections: Not on file   Intimate Partner Violence: Not on file   Depression: Not on file   Housing Stability: Not on file   Interpersonal Safety: Not At Risk (7/19/2024)    Interpersonal Safety Domain Source: IP Abuse Screening     Physical abuse: Denies     Verbal abuse: Denies     Emotional abuse: Denies     Financial abuse: Denies     Sexual abuse: Denies   Utilities: Not on file       PHYSICAL EXAM   Physical Exam  Physical Exam  Constitutional:       General: No acute distress.     Appearance: Normal appearance.  Not toxic-appearing.   HENT:      Head: Normocephalic and atraumatic.      Nose: Nose normal.      Mouth/Throat:      Mouth: Mucous membranes are moist.   Eyes:      Extraocular Movements: Extraocular movements intact.      Pupils: Pupils are equal, round, and reactive to light.   Cardiovascular:      Rate and Rhythm: Normal rate.      Pulses: Normal pulses.   Pulmonary:      Effort: Pulmonary effort is normal.      Breath sounds: No stridor.   Abdominal:      General: Abdomen is flat. There is no distension.  Epigastric tenderness to palpation with with voluntary guarding but without rebound  Musculoskeletal:         General: Normal range of motion.      Cervical back: Normal range of motion and neck supple.   Skin:     General: Skin is warm and dry.      Capillary Refill: Capillary refill takes less than 2 seconds.   Neurological:      General: No focal deficit present.      Mental Status: Aert and oriented to person, place, and time.         SCREENINGS                No data recorded    LAB, EKG AND DIAGNOSTIC RESULTS   Labs:  Recent Results (from the past 12     Performed by: Evie To        EKG:.Not Applicable    Radiologic Studies:  Non-plain film images such as CT, Ultrasound and MRI are read by the radiologist. Plain radiographic images are visualized and preliminarily interpreted by the ED Provider with the following findings: Not Applicable.    Interpretation per the Radiologist below, if available at the time of this note:  CT ABDOMEN PELVIS W IV CONTRAST Additional Contrast? None   Final Result   No acute abdominal or pelvic process.      Electronically signed by Nehemias Wright           ED COURSE and DIFFERENTIAL DIAGNOSIS/MDM   3:04 AM Differential and Considerations: Patient was seen by me yesterday, had resolved symptoms after GI cocktail and Zofran, we thought it was most likely something he ate versus possible viral infection.  He had a minimal elevation of his WBC yesterday, now that he is feeling worse again I will get a CT in addition to reevaluate his labs and treating his symptoms.    Records Reviewed (source and summary of external notes): Prior medical records and Nursing notes.    Vitals:    Vitals:    07/19/24 0015 07/19/24 0124   BP: (!) 137/90 122/60   Pulse: 81 66   Resp: 16 16   Temp: 98.2 °F (36.8 °C)    TempSrc: Oral    SpO2: 100% 98%   Weight: 106.6 kg (235 lb)    Height: 1.88 m (6' 2\")         ED COURSE   Patient with unremarkable workup, improvement again after Zofran and GI cocktail.  Patient states he did not feel his medications are prescribed to him yesterday's visit.  Discussed the need to do so, GI referral, return precautions.    SEPSIS Reassessment: Sepsis reassessment not applicable    Clinical Management Tools:  Not Applicable    Patient was given the following medications:  Medications   ondansetron (ZOFRAN) injection 4 mg (4 mg IntraVENous Given 7/19/24 0033)   iopamidol (ISOVUE-370) 76 % injection 100 mL (100 mLs IntraVENous Given 7/19/24 0054)   lidocaine viscous hcl (XYLOCAINE) 2 % solution 15 mL (15 mLs Mouth/Throat

## 2024-07-19 NOTE — ED TRIAGE NOTES
Abd pain with nausea and vomiting started yesterday. Seen in ed this am. Pt reports pain continues

## 2024-07-19 NOTE — DISCHARGE INSTRUCTIONS
Thank you for choosing our Emergency Department for your care.  It is our privilege to care for you in your time of need.  In the next several days, you may receive a survey via email or mailed to your home about your experience with our team.  We would greatly appreciate you taking a few minutes to complete the survey, as we use this information to learn what we have done well and what we could be doing better. Thank you for trusting us with your care!    Below you will find a list of your tests from today's visit.   Labs  Recent Results (from the past 12 hour(s))   CBC with Auto Differential    Collection Time: 07/19/24 12:35 AM   Result Value Ref Range    WBC 8.9 4.1 - 11.1 K/uL    RBC 5.74 (H) 4.10 - 5.70 M/uL    Hemoglobin 14.6 12.1 - 17.0 g/dL    Hematocrit 45.3 36.6 - 50.3 %    MCV 78.9 (L) 80.0 - 99.0 FL    MCH 25.4 (L) 26.0 - 34.0 PG    MCHC 32.2 30.0 - 36.5 g/dL    RDW 14.4 11.5 - 14.5 %    Platelets 365 150 - 400 K/uL    MPV 10.1 8.9 - 12.9 FL    Nucleated RBCs 0.0 0.0  WBC    nRBC 0.00 0.00 - 0.01 K/uL    Neutrophils % 65 32 - 75 %    Lymphocytes % 25 12 - 49 %    Monocytes % 9 5 - 13 %    Eosinophils % 1 0 - 7 %    Basophils % 0 0 - 1 %    Immature Granulocytes % 0 0 - 0.5 %    Neutrophils Absolute 5.8 1.8 - 8.0 K/UL    Lymphocytes Absolute 2.2 0.8 - 3.5 K/UL    Monocytes Absolute 0.8 0.0 - 1.0 K/UL    Eosinophils Absolute 0.1 0.0 - 0.4 K/UL    Basophils Absolute 0.0 0.0 - 0.1 K/UL    Immature Granulocytes Absolute 0.0 0.00 - 0.04 K/UL    Differential Type AUTOMATED     Hepatic Function Panel    Collection Time: 07/19/24 12:35 AM   Result Value Ref Range    Total Protein 7.7 6.4 - 8.2 g/dL    Albumin 4.3 3.5 - 5.0 g/dL    Globulin 3.4 2.0 - 4.0 g/dL    Albumin/Globulin Ratio 1.3 1.1 - 2.2      Total Bilirubin 1.5 (H) 0.2 - 1.0 mg/dL    Bilirubin, Direct 0.7 (H) 0.0 - 0.2 mg/dL    Alk Phosphatase 60 45 - 117 U/L     (H) 15 - 37 U/L     (H) 12 - 78 U/L   Lipase    Collection Time:

## 2024-11-26 ENCOUNTER — HOSPITAL ENCOUNTER (OUTPATIENT)
Facility: HOSPITAL | Age: 24
Discharge: HOME | End: 2024-11-29
Attending: INTERNAL MEDICINE
Payer: MEDICAID

## 2024-11-26 DIAGNOSIS — R10.9 STOMACH ACHE: ICD-10-CM

## 2024-11-26 PROCEDURE — 76705 ECHO EXAM OF ABDOMEN: CPT

## 2025-03-11 ENCOUNTER — HOSPITAL ENCOUNTER (EMERGENCY)
Facility: HOSPITAL | Age: 25
Discharge: HOME | End: 2025-03-11
Attending: EMERGENCY MEDICINE
Payer: MEDICAID

## 2025-03-11 VITALS
WEIGHT: 230 LBS | RESPIRATION RATE: 17 BRPM | HEIGHT: 74 IN | DIASTOLIC BLOOD PRESSURE: 91 MMHG | OXYGEN SATURATION: 99 % | SYSTOLIC BLOOD PRESSURE: 142 MMHG | HEART RATE: 82 BPM | TEMPERATURE: 97.6 F | BODY MASS INDEX: 29.52 KG/M2

## 2025-03-11 DIAGNOSIS — R11.0 NAUSEA: ICD-10-CM

## 2025-03-11 DIAGNOSIS — G47.00 INSOMNIA, UNSPECIFIED TYPE: Primary | ICD-10-CM

## 2025-03-11 LAB
EKG ATRIAL RATE: 64 BPM
EKG DIAGNOSIS: NORMAL
EKG P AXIS: 73 DEGREES
EKG P-R INTERVAL: 132 MS
EKG Q-T INTERVAL: 382 MS
EKG QRS DURATION: 92 MS
EKG QTC CALCULATION (BAZETT): 394 MS
EKG R AXIS: 70 DEGREES
EKG T AXIS: 66 DEGREES
EKG VENTRICULAR RATE: 64 BPM
FLUAV RNA SPEC QL NAA+PROBE: NOT DETECTED
FLUBV RNA SPEC QL NAA+PROBE: NOT DETECTED
SARS-COV-2 RNA RESP QL NAA+PROBE: NOT DETECTED

## 2025-03-11 PROCEDURE — 99284 EMERGENCY DEPT VISIT MOD MDM: CPT

## 2025-03-11 PROCEDURE — 93005 ELECTROCARDIOGRAM TRACING: CPT | Performed by: EMERGENCY MEDICINE

## 2025-03-11 PROCEDURE — 87636 SARSCOV2 & INF A&B AMP PRB: CPT

## 2025-03-11 PROCEDURE — 6370000000 HC RX 637 (ALT 250 FOR IP): Performed by: EMERGENCY MEDICINE

## 2025-03-11 RX ORDER — ONDANSETRON 4 MG/1
4 TABLET, ORALLY DISINTEGRATING ORAL ONCE
Status: COMPLETED | OUTPATIENT
Start: 2025-03-11 | End: 2025-03-11

## 2025-03-11 RX ORDER — ONDANSETRON 4 MG/1
4 TABLET, ORALLY DISINTEGRATING ORAL EVERY 8 HOURS PRN
Qty: 20 TABLET | Refills: 0 | Status: SHIPPED | OUTPATIENT
Start: 2025-03-11

## 2025-03-11 RX ADMIN — ONDANSETRON 4 MG: 4 TABLET, ORALLY DISINTEGRATING ORAL at 06:09

## 2025-03-11 ASSESSMENT — PAIN SCALES - GENERAL: PAINLEVEL_OUTOF10: 2

## 2025-03-11 NOTE — ED TRIAGE NOTES
Pt arrived w multiple complaints; insomnia, gas/bloating, nausea and hot flashes x 3-4 hours. Pt states he hasn't been able to sleep in a couple days. Denies any stress. When pt began to fall asleep felt his heart racing, tried to get up knees weak, and almost fell out.

## 2025-03-11 NOTE — ED PROVIDER NOTES
[facility] EMERGENCY DEPT  EMERGENCY DEPARTMENT HISTORY AND PHYSICAL EXAM      Date: 3/11/2025  Patient Name: Vinay Clay III  MRN: [id number]  Birthdate 2000  Date of evaluation: 3/11/2025  Provider: Dominguez Spencer MD   Note Started: 6:00 AM EDT 3/11/25    HISTORY OF PRESENT ILLNESS     Chief Complaint   Patient presents with    Insomnia    Nausea       History Provided By: Patient    HPI: Vinay Clay III is a 25 y.o. male presents for multiple complaints including nausea and diarrhea.  Patient states he felt like his heart was racing.  Patient states he had \"hot flashes\" over the last few hours.  Patient also reports insomnia.  Denies chest pain or shortness of breath.    PAST MEDICAL HISTORY   Past Medical History:  Past Medical History:   Diagnosis Date    Depression        Past Surgical History:  No past surgical history on file.    Family History:  No family history on file.    Social History:  Social History     Tobacco Use    Smoking status: Never    Smokeless tobacco: Never   Vaping Use    Vaping status: Never Used   Substance Use Topics    Alcohol use: Not Currently    Drug use: Never       Allergies:  No Known Allergies    PCP: Velia Pop MD    Current Meds:   No current facility-administered medications for this encounter.     Current Outpatient Medications   Medication Sig Dispense Refill    ondansetron (ZOFRAN-ODT) 4 MG disintegrating tablet Take 1 tablet by mouth every 8 hours as needed for Nausea or Vomiting 20 tablet 0    ondansetron (ZOFRAN-ODT) 4 MG disintegrating tablet Place 1 tablet under the tongue every 8 hours as needed for Nausea or Vomiting 20 tablet 0    lidocaine viscous hcl (XYLOCAINE) 2 % SOLN solution Take 15 mLs by mouth every 4 hours as needed (Reflux/abdominal pain) Mix each dose at home with 30 mL over-the-counter Maalox or Mylanta 250 mL 0       Social Determinants of Health:   Social Drivers of Health     Tobacco Use: Low Risk  (7/19/2024)    Patient History

## 2025-03-11 NOTE — DISCHARGE INSTRUCTIONS
Thank you for choosing our Emergency Department for your care.  It is our privilege to care for you in your time of need.  In the next several days, you may receive a survey via email or mailed to your home about your experience with our team.  We would greatly appreciate you taking a few minutes to complete the survey, as we use this information to learn what we have done well and what we could be doing better. Thank you for trusting us with your care!    Below you will find a list of your tests from today's visit.   Labs and Radiology Studies  Recent Results (from the past 12 hours)   COVID-19 & Influenza Combo    Collection Time: 03/11/25  6:07 AM    Specimen: Nasopharyngeal   Result Value Ref Range    SARS-CoV-2, PCR Not Detected Not Detected      Rapid Influenza A By PCR Not Detected Not Detected      Rapid Influenza B By PCR Not Detected Not Detected       No results found.  ------------------------------------------------------------------------------------------------------------  The evaluation and treatment you received in the Emergency Department were for an urgent problem. It is important that you follow-up with a doctor, nurse practitioner, or physician assistant to:  (1) confirm your diagnosis,  (2) re-evaluation of changes in your illness and treatment, and (3) for ongoing care. Please take your discharge instructions with you when you go to your follow-up appointment.     If you have any problem arranging a follow-up appointment, contact us!  If your symptoms become worse or you do not improve as expected, please return to us. We are available 24 hours a day.     If a prescription has been provided, please fill it as soon as possible to prevent a delay in treatment. If you have any questions or reservations about taking the medication due to side effects or interactions with other medications, please call your primary care provider or contact us directly.  Again, THANK YOU for choosing us to care

## 2025-03-14 ENCOUNTER — HOSPITAL ENCOUNTER (EMERGENCY)
Facility: HOSPITAL | Age: 25
Discharge: HOME | End: 2025-03-14
Attending: EMERGENCY MEDICINE
Payer: MEDICAID

## 2025-03-14 ENCOUNTER — APPOINTMENT (OUTPATIENT)
Facility: HOSPITAL | Age: 25
End: 2025-03-14
Payer: MEDICAID

## 2025-03-14 VITALS
WEIGHT: 225 LBS | RESPIRATION RATE: 20 BRPM | HEART RATE: 75 BPM | BODY MASS INDEX: 28.88 KG/M2 | SYSTOLIC BLOOD PRESSURE: 115 MMHG | HEIGHT: 74 IN | DIASTOLIC BLOOD PRESSURE: 64 MMHG | OXYGEN SATURATION: 100 % | TEMPERATURE: 98.1 F

## 2025-03-14 DIAGNOSIS — K80.20 GALLSTONES: Primary | ICD-10-CM

## 2025-03-14 LAB
ALBUMIN SERPL-MCNC: 4.1 G/DL (ref 3.5–5)
ALBUMIN/GLOB SERPL: 1.1 (ref 1.1–2.2)
ALP SERPL-CCNC: 42 U/L (ref 45–117)
ALT SERPL-CCNC: 26 U/L (ref 12–78)
ANION GAP SERPL CALC-SCNC: 9 MMOL/L (ref 2–12)
AST SERPL W P-5'-P-CCNC: 21 U/L (ref 15–37)
BASOPHILS # BLD: 0.03 K/UL (ref 0–0.1)
BASOPHILS NFR BLD: 0.5 % (ref 0–1)
BILIRUB SERPL-MCNC: 1.8 MG/DL (ref 0.2–1)
BUN SERPL-MCNC: 14 MG/DL (ref 6–20)
BUN/CREAT SERPL: 11 (ref 12–20)
CA-I BLD-MCNC: 9.8 MG/DL (ref 8.5–10.1)
CHLORIDE SERPL-SCNC: 103 MMOL/L (ref 97–108)
CO2 SERPL-SCNC: 27 MMOL/L (ref 21–32)
CREAT SERPL-MCNC: 1.26 MG/DL (ref 0.7–1.3)
DIFFERENTIAL METHOD BLD: ABNORMAL
EOSINOPHIL # BLD: 0.14 K/UL (ref 0–0.4)
EOSINOPHIL NFR BLD: 2.3 % (ref 0–7)
ERYTHROCYTE [DISTWIDTH] IN BLOOD BY AUTOMATED COUNT: 14.6 % (ref 11.5–14.5)
ETHANOL SERPL-MCNC: <10 MG/DL (ref 0–0.08)
GLOBULIN SER CALC-MCNC: 3.8 G/DL (ref 2–4)
GLUCOSE SERPL-MCNC: 66 MG/DL (ref 65–100)
HCT VFR BLD AUTO: 47.4 % (ref 36.6–50.3)
HGB BLD-MCNC: 15.1 G/DL (ref 12.1–17)
IMM GRANULOCYTES # BLD AUTO: 0.01 K/UL (ref 0–0.04)
IMM GRANULOCYTES NFR BLD AUTO: 0.2 % (ref 0–0.5)
LIPASE SERPL-CCNC: 19 U/L (ref 13–75)
LYMPHOCYTES # BLD: 1.97 K/UL (ref 0.8–3.5)
LYMPHOCYTES NFR BLD: 32 % (ref 12–49)
MCH RBC QN AUTO: 25.1 PG (ref 26–34)
MCHC RBC AUTO-ENTMCNC: 31.9 G/DL (ref 30–36.5)
MCV RBC AUTO: 78.9 FL (ref 80–99)
MONOCYTES # BLD: 0.55 K/UL (ref 0–1)
MONOCYTES NFR BLD: 8.9 % (ref 5–13)
NEUTS SEG # BLD: 3.45 K/UL (ref 1.8–8)
NEUTS SEG NFR BLD: 56.1 % (ref 32–75)
NRBC # BLD: 0 K/UL (ref 0–0.01)
NRBC BLD-RTO: 0 PER 100 WBC
PLATELET # BLD AUTO: 356 K/UL (ref 150–400)
PMV BLD AUTO: 10.4 FL (ref 8.9–12.9)
POTASSIUM SERPL-SCNC: 3.6 MMOL/L (ref 3.5–5.1)
PROT SERPL-MCNC: 7.9 G/DL (ref 6.4–8.2)
RBC # BLD AUTO: 6.01 M/UL (ref 4.1–5.7)
SODIUM SERPL-SCNC: 139 MMOL/L (ref 136–145)
WBC # BLD AUTO: 6.2 K/UL (ref 4.1–11.1)

## 2025-03-14 PROCEDURE — 80053 COMPREHEN METABOLIC PANEL: CPT

## 2025-03-14 PROCEDURE — 6360000002 HC RX W HCPCS: Performed by: EMERGENCY MEDICINE

## 2025-03-14 PROCEDURE — 99284 EMERGENCY DEPT VISIT MOD MDM: CPT

## 2025-03-14 PROCEDURE — 76705 ECHO EXAM OF ABDOMEN: CPT

## 2025-03-14 PROCEDURE — 96375 TX/PRO/DX INJ NEW DRUG ADDON: CPT

## 2025-03-14 PROCEDURE — 83690 ASSAY OF LIPASE: CPT

## 2025-03-14 PROCEDURE — 85025 COMPLETE CBC W/AUTO DIFF WBC: CPT

## 2025-03-14 PROCEDURE — 36415 COLL VENOUS BLD VENIPUNCTURE: CPT

## 2025-03-14 PROCEDURE — 6370000000 HC RX 637 (ALT 250 FOR IP): Performed by: EMERGENCY MEDICINE

## 2025-03-14 PROCEDURE — 2580000003 HC RX 258: Performed by: EMERGENCY MEDICINE

## 2025-03-14 PROCEDURE — 82077 ASSAY SPEC XCP UR&BREATH IA: CPT

## 2025-03-14 PROCEDURE — 96374 THER/PROPH/DIAG INJ IV PUSH: CPT

## 2025-03-14 RX ORDER — MAGNESIUM HYDROXIDE/ALUMINUM HYDROXICE/SIMETHICONE 120; 1200; 1200 MG/30ML; MG/30ML; MG/30ML
30 SUSPENSION ORAL
Status: COMPLETED | OUTPATIENT
Start: 2025-03-14 | End: 2025-03-14

## 2025-03-14 RX ORDER — HYDROMORPHONE HYDROCHLORIDE 1 MG/ML
1 INJECTION, SOLUTION INTRAMUSCULAR; INTRAVENOUS; SUBCUTANEOUS
Refills: 0 | Status: COMPLETED | OUTPATIENT
Start: 2025-03-14 | End: 2025-03-14

## 2025-03-14 RX ORDER — LIDOCAINE HYDROCHLORIDE 20 MG/ML
15 SOLUTION OROPHARYNGEAL
Status: COMPLETED | OUTPATIENT
Start: 2025-03-14 | End: 2025-03-14

## 2025-03-14 RX ORDER — 0.9 % SODIUM CHLORIDE 0.9 %
1000 INTRAVENOUS SOLUTION INTRAVENOUS ONCE
Status: COMPLETED | OUTPATIENT
Start: 2025-03-14 | End: 2025-03-14

## 2025-03-14 RX ORDER — HYDROCODONE BITARTRATE AND ACETAMINOPHEN 5; 325 MG/1; MG/1
1 TABLET ORAL EVERY 6 HOURS PRN
Qty: 10 TABLET | Refills: 0 | Status: SHIPPED | OUTPATIENT
Start: 2025-03-14 | End: 2025-03-17

## 2025-03-14 RX ORDER — ONDANSETRON 2 MG/ML
4 INJECTION INTRAMUSCULAR; INTRAVENOUS ONCE
Status: COMPLETED | OUTPATIENT
Start: 2025-03-14 | End: 2025-03-14

## 2025-03-14 RX ORDER — ONDANSETRON 4 MG/1
4 TABLET, ORALLY DISINTEGRATING ORAL 3 TIMES DAILY PRN
Qty: 21 TABLET | Refills: 0 | Status: SHIPPED | OUTPATIENT
Start: 2025-03-14

## 2025-03-14 RX ADMIN — ALUMINUM HYDROXIDE, MAGNESIUM HYDROXIDE, AND SIMETHICONE 30 ML: 200; 200; 20 SUSPENSION ORAL at 14:10

## 2025-03-14 RX ADMIN — SODIUM CHLORIDE 1000 ML: 0.9 INJECTION, SOLUTION INTRAVENOUS at 11:19

## 2025-03-14 RX ADMIN — HYDROMORPHONE HYDROCHLORIDE 1 MG: 1 INJECTION, SOLUTION INTRAMUSCULAR; INTRAVENOUS; SUBCUTANEOUS at 11:21

## 2025-03-14 RX ADMIN — LIDOCAINE HYDROCHLORIDE 15 ML: 20 SOLUTION ORAL at 14:10

## 2025-03-14 RX ADMIN — ONDANSETRON 4 MG: 2 INJECTION, SOLUTION INTRAMUSCULAR; INTRAVENOUS at 11:21

## 2025-03-14 ASSESSMENT — PAIN SCALES - GENERAL: PAINLEVEL_OUTOF10: 8

## 2025-03-14 ASSESSMENT — PAIN - FUNCTIONAL ASSESSMENT: PAIN_FUNCTIONAL_ASSESSMENT: 0-10

## 2025-03-14 ASSESSMENT — LIFESTYLE VARIABLES
HOW OFTEN DO YOU HAVE A DRINK CONTAINING ALCOHOL: MONTHLY OR LESS
HOW MANY STANDARD DRINKS CONTAINING ALCOHOL DO YOU HAVE ON A TYPICAL DAY: 1 OR 2

## 2025-03-14 NOTE — DISCHARGE INSTRUCTIONS
Thank you for choosing our Emergency Department for your care.  It is our privilege to care for you in your time of need.  In the next several days, you may receive a survey via email or mailed to your home about your experience with our team.  We would greatly appreciate you taking a few minutes to complete the survey, as we use this information to learn what we have done well and what we could be doing better. Thank you for trusting us with your care!    Below you will find a list of your tests from today's visit.   Labs and Radiology Studies  Recent Results (from the past 12 hours)   ETOH    Collection Time: 03/14/25 10:54 AM   Result Value Ref Range    Ethanol Lvl <10 <10 mg/dL   CBC with Auto Differential    Collection Time: 03/14/25 10:54 AM   Result Value Ref Range    WBC 6.2 4.1 - 11.1 K/uL    RBC 6.01 (H) 4.10 - 5.70 M/uL    Hemoglobin 15.1 12.1 - 17.0 g/dL    Hematocrit 47.4 36.6 - 50.3 %    MCV 78.9 (L) 80.0 - 99.0 FL    MCH 25.1 (L) 26.0 - 34.0 PG    MCHC 31.9 30.0 - 36.5 g/dL    RDW 14.6 (H) 11.5 - 14.5 %    Platelets 356 150 - 400 K/uL    MPV 10.4 8.9 - 12.9 FL    Nucleated RBCs 0.0 0.0  WBC    nRBC 0.00 0.00 - 0.01 K/uL    Neutrophils % 56.1 32.0 - 75.0 %    Lymphocytes % 32.0 12.0 - 49.0 %    Monocytes % 8.9 5.0 - 13.0 %    Eosinophils % 2.3 0.0 - 7.0 %    Basophils % 0.5 0.0 - 1.0 %    Immature Granulocytes % 0.2 0 - 0.5 %    Neutrophils Absolute 3.45 1.80 - 8.00 K/UL    Lymphocytes Absolute 1.97 0.80 - 3.50 K/UL    Monocytes Absolute 0.55 0.00 - 1.00 K/UL    Eosinophils Absolute 0.14 0.00 - 0.40 K/UL    Basophils Absolute 0.03 0.00 - 0.10 K/UL    Immature Granulocytes Absolute 0.01 0.00 - 0.04 K/UL    Differential Type AUTOMATED     Comprehensive Metabolic Panel    Collection Time: 03/14/25 10:54 AM   Result Value Ref Range    Sodium 139 136 - 145 mmol/L    Potassium 3.6 3.5 - 5.1 mmol/L    Chloride 103 97 - 108 mmol/L    CO2 27 21 - 32 mmol/L    Anion Gap 9 2 - 12 mmol/L    Glucose 66  not improve as expected, please return to us. We are available 24 hours a day.     If a prescription has been provided, please fill it as soon as possible to prevent a delay in treatment. If you have any questions or reservations about taking the medication due to side effects or interactions with other medications, please call your primary care provider or contact us directly.  Again, THANK YOU for choosing us to care for YOU!

## 2025-03-14 NOTE — ED PROVIDER NOTES
[facility] EMERGENCY DEPT  EMERGENCY DEPARTMENT HISTORY AND PHYSICAL EXAM      Date: 3/14/2025  Patient Name: Vinay Clay III  MRN: [id number]  Birthdate 2000  Date of evaluation: 3/14/2025  Provider: Deanna Vasquez MD   Note Started: 1:36 PM EDT 3/14/25    HISTORY OF PRESENT ILLNESS     Chief Complaint   Patient presents with    Abdominal Pain       History Provided By: Patient    HPI: Vinay Clay III is a 25 y.o. male patient with a history of pancreatitis but no recent alcohol presents with epigastric abdominal pain without radiation.  No nausea vomiting diarrhea or fevers.    PAST MEDICAL HISTORY   Past Medical History:  Past Medical History:   Diagnosis Date    Depression        Past Surgical History:  No past surgical history on file.    Family History:  No family history on file.    Social History:  Social History     Tobacco Use    Smoking status: Never    Smokeless tobacco: Never   Vaping Use    Vaping status: Never Used   Substance Use Topics    Alcohol use: Not Currently    Drug use: Never       Allergies:  No Known Allergies    PCP: Velia Pop MD    Current Meds:   Current Facility-Administered Medications   Medication Dose Route Frequency Provider Last Rate Last Admin    lidocaine viscous hcl (XYLOCAINE) 2 % solution 15 mL  15 mL Mouth/Throat NOW Deanna Vasquez MD        aluminum & magnesium hydroxide-simethicone (MAALOX PLUS) 200-200-20 MG/5ML suspension 30 mL  30 mL Oral NOW Deanna Vasquez MD         Current Outpatient Medications   Medication Sig Dispense Refill    ondansetron (ZOFRAN-ODT) 4 MG disintegrating tablet Take 1 tablet by mouth 3 times daily as needed for Nausea or Vomiting 21 tablet 0    HYDROcodone-acetaminophen (NORCO) 5-325 MG per tablet Take 1 tablet by mouth every 6 hours as needed for Pain for up to 3 days. Intended supply: 3 days. Take lowest dose possible to manage pain Max Daily Amount: 4 tablets 10 tablet 0    ondansetron (ZOFRAN-ODT) 4 MG disintegrating  liver lesion.      Main portal vein flow: Hepatopedal     Fluid: No ascites.     Gallbladder: Mobile stones in the gallbladder. No gallbladder wall thickening or  pericholecystic fluid.      Bile ducts: There is no intra or extrahepatic biliary ductal dilatation.  The  common bile duct measures 6 mm.     Pancreas: The pancreas is not well visualized due to overlying bowel gas.     Kidneys: Right length: 9.8 cm. No hydronephrosis.     IMPRESSION:  Cholelithiasis without gallbladder inflammation.      Electronically signed by NURIS MCFARLAND   [HP]   1336 Patient with gallstones.  Benign exam on reassessment.  No evidence of acute cholecystitis.  No evidence of choledocho cholelithiasis.  Plan to discharge home with pain control surgery follow-up outpatient [HP]      ED Course User Index  [HP] Deanna Vasquez MD       SEPSIS Reassessment: Patient does NOT meet Sepsis criteria after ED workup    Clinical Management Tools:  Not Applicable    Patient was given the following medications:  Medications   lidocaine viscous hcl (XYLOCAINE) 2 % solution 15 mL (has no administration in time range)   aluminum & magnesium hydroxide-simethicone (MAALOX PLUS) 200-200-20 MG/5ML suspension 30 mL (has no administration in time range)   HYDROmorphone HCl PF (DILAUDID) injection 1 mg (1 mg IntraVENous Given 3/14/25 1121)   sodium chloride 0.9 % bolus 1,000 mL (1,000 mLs IntraVENous New Bag 3/14/25 1119)   ondansetron (ZOFRAN) injection 4 mg (4 mg IntraVENous Given 3/14/25 1121)       CONSULTS: See ED Course/MDM for further details.  None     Social Determinants affecting Diagnosis/Treatment: None    Smoking Cessation: Not Applicable    PROCEDURES   Unless otherwise noted above, none  Procedures      CRITICAL CARE TIME   Patient does not meet Critical Care Time, 0 minutes    ED IMPRESSION     1. Gallstones          DISPOSITION/PLAN   DISPOSITION Decision To Discharge 03/14/2025 01:34:53 PM   DISPOSITION CONDITION Stable        Discharge

## 2025-03-14 NOTE — ED TRIAGE NOTES
Persistent upper left abd pain that increases with PO intake. Unable to tolerate PO intake at this time.